# Patient Record
(demographics unavailable — no encounter records)

---

## 2025-04-30 NOTE — PHYSICAL EXAM
[de-identified] : Neurologic: normal mood and affect, orientated and able to communicate Constitutional: well developed and well nourished  Left Knee: mild effusion +Lachman +anterior drawer Proximal MCL tenderness 1+ valgus with pain

## 2025-04-30 NOTE — DISCUSSION/SUMMARY
[de-identified] : Reviewed all X Ray images with patient today and interpretation was provided. Reviewed all MRI images with patient today and interpretation was provided. Discussed treatment options, including surgery. Patient deferred surgery at this time.   Advised patient to custom A22 wear knee brace. Information for Shock Doctor knee brace provided. Doctor email provided to patient.   Will consider surgery in a few months, will email with decision  Anila Custom Knee Brace The patient is prescribed a custom A22 brace today for return to activities of daily living. This brace is indicated to protect the involved knee due to instability during the continued ligamentous healing process, and while the patient increases their activities of daily living. At this point the patient presents with sustained muscle atrophy, proprioceptive deficiency and motor imbalance in their involved knee. The custom nature of the brace is required to match the natural contours of this patient's thigh to calf ratio which would not fit into an off the shelf model     ----------------------------------------------- Home Exercise The patient is instructed on a home exercise program.   YESI ORANTES Acting as a Scribe for Dr. Mikael HINKLE, Yesi Orantes, attest that this documentation has been prepared under the direction and in the presence of Provider Dr. Youssef.   Activity Modification The patient was advised to modify their activities.   Dx / Natural History The patient was advised of the diagnosis. The natural history of the pathology was explained in full to the patient in layman's terms. Several different treatment options were discussed and explained in full to the patient including the risks and benefits of both surgical and non-surgical treatments.  All questions and concerns were answered.   Pain Guide Activities The patient was advised to let pain guide the gradual advancement of activities.   ANUPAM HINKLE explained to the patient that rest, ice, compression, and elevation would benefit them. They may return to activity after follow-up or when they no longer have any pain.   The patient's current medication management of their orthopedic diagnosis was reviewed today: (1) We discussed a comprehensive treatment plan that included possible pharmaceutical management involving the use of prescription strength medications including but not limited to options such as oral Naprosyn 500mg BID, once daily Meloxicam 15 mg, or 500-650 mg Tylenol versus over the counter oral medications and topical prescription NSAID Pennsaid vs over the counter Voltaren gel. (2) There is a moderate risk of morbidity with further treatment, especially from use of prescription strength medications and possible side effects of these medications which include upset stomach with oral medications, skin reactions to topical medications and cardiac/renal issues with long term use. (3) I recommended that the patient follow-up with their medical physician to discuss any significant specific potential issues with long term medication use such as interactions with current medications or with exacerbation of underlying medical comorbidities. (4) The benefits and risks associated with use of injectable, oral or topical, prescription and over the counter anti-inflammatory medications were discussed with the patient. The patient voiced understanding of the risks including but not limited to bleeding, stroke, kidney dysfunction, heart disease, and were referred to the black box warning label for further information.

## 2025-04-30 NOTE — HISTORY OF PRESENT ILLNESS
[de-identified] : The patient is a 22 year old [RIGHT] hand dominant male who presents today complaining of LT KNEE Pain.   Date of Injury/Onset: 4/25/25 Pain:    At Rest: 2/10  With Activity:  6/10  Mechanism of injury: First hurt his knee snowboarding in Feb, then twisted it Friday This is NOT a Work Related Injury being treated under Worker's Compensation. This is NOT an athletic injury occurring associated with an interscholastic or organized sports team. Quality of symptoms: diffuse pain throughout knee Improves with: res, activity modification, use of brace Worse with: change of direction, pivotting, FWB Prior treatment: seen at [facility] on 4/27/25 Prior Imaging: MRI @ [facility] 4/27/25 Out of work/sport: currently working  School/Sport/Position/Occupation: HVAC Additional Information: None

## 2025-04-30 NOTE — HISTORY OF PRESENT ILLNESS
[de-identified] : The patient is a 22 year old [RIGHT] hand dominant male who presents today complaining of LT KNEE Pain.   Date of Injury/Onset: 4/25/25 Pain:    At Rest: 2/10  With Activity:  6/10  Mechanism of injury: First hurt his knee snowboarding in Feb, then twisted it Friday This is NOT a Work Related Injury being treated under Worker's Compensation. This is NOT an athletic injury occurring associated with an interscholastic or organized sports team. Quality of symptoms: diffuse pain throughout knee Improves with: res, activity modification, use of brace Worse with: change of direction, pivotting, FWB Prior treatment: seen at [facility] on 4/27/25 Prior Imaging: MRI @ [facility] 4/27/25 Out of work/sport: currently working  School/Sport/Position/Occupation: HVAC Additional Information: None

## 2025-04-30 NOTE — ADDENDUM
[FreeTextEntry1] : Documented by Benny Avery acting as a scribe for Dr. Youssef and Gray Wheat PA-C on 04/29/2025 and was presence for the following sections: Physical Exam; Data Reviewed; Assessment; Discussion/Summary. All medical record entries made by the Scribe were at my, Dr. Youssef, and Gray Wheat, direction and personally dictated by me on 04/29/2025. I have reviewed the chart and agree that the record accurately reflects my personal performance of the history, physical exam, procedure and imaging.

## 2025-04-30 NOTE — PHYSICAL EXAM
[de-identified] : Neurologic: normal mood and affect, orientated and able to communicate Constitutional: well developed and well nourished  Left Knee: mild effusion +Lachman +anterior drawer Proximal MCL tenderness 1+ valgus with pain

## 2025-04-30 NOTE — DISCUSSION/SUMMARY
[de-identified] : Reviewed all X Ray images with patient today and interpretation was provided. Reviewed all MRI images with patient today and interpretation was provided. Discussed treatment options, including surgery. Patient deferred surgery at this time.   Advised patient to custom A22 wear knee brace. Information for Shock Doctor knee brace provided. Doctor email provided to patient.   Will consider surgery in a few months, will email with decision  Anila Custom Knee Brace The patient is prescribed a custom A22 brace today for return to activities of daily living. This brace is indicated to protect the involved knee due to instability during the continued ligamentous healing process, and while the patient increases their activities of daily living. At this point the patient presents with sustained muscle atrophy, proprioceptive deficiency and motor imbalance in their involved knee. The custom nature of the brace is required to match the natural contours of this patient's thigh to calf ratio which would not fit into an off the shelf model     ----------------------------------------------- Home Exercise The patient is instructed on a home exercise program.   YESI ORANTES Acting as a Scribe for Dr. Mikael HINKLE, Yesi Orantes, attest that this documentation has been prepared under the direction and in the presence of Provider Dr. Youssef.   Activity Modification The patient was advised to modify their activities.   Dx / Natural History The patient was advised of the diagnosis. The natural history of the pathology was explained in full to the patient in layman's terms. Several different treatment options were discussed and explained in full to the patient including the risks and benefits of both surgical and non-surgical treatments.  All questions and concerns were answered.   Pain Guide Activities The patient was advised to let pain guide the gradual advancement of activities.   ANUPAM HIKNLE explained to the patient that rest, ice, compression, and elevation would benefit them. They may return to activity after follow-up or when they no longer have any pain.   The patient's current medication management of their orthopedic diagnosis was reviewed today: (1) We discussed a comprehensive treatment plan that included possible pharmaceutical management involving the use of prescription strength medications including but not limited to options such as oral Naprosyn 500mg BID, once daily Meloxicam 15 mg, or 500-650 mg Tylenol versus over the counter oral medications and topical prescription NSAID Pennsaid vs over the counter Voltaren gel. (2) There is a moderate risk of morbidity with further treatment, especially from use of prescription strength medications and possible side effects of these medications which include upset stomach with oral medications, skin reactions to topical medications and cardiac/renal issues with long term use. (3) I recommended that the patient follow-up with their medical physician to discuss any significant specific potential issues with long term medication use such as interactions with current medications or with exacerbation of underlying medical comorbidities. (4) The benefits and risks associated with use of injectable, oral or topical, prescription and over the counter anti-inflammatory medications were discussed with the patient. The patient voiced understanding of the risks including but not limited to bleeding, stroke, kidney dysfunction, heart disease, and were referred to the black box warning label for further information.

## 2025-04-30 NOTE — DATA REVIEWED
[FreeTextEntry1] : 4/27/25 [facility] MRI Left Knee: This scan was reviewed and interpreted by Dr. Youssef, and his findings are- IMPRESSION: 1. Full-thickness rupture of the anterior cruciate ligament. No apparent osseous contusions in the lateral compartment to suggest a recent pivot shift. 2. Low-grade MCL sprain with surrounding mild soft tissue edema. 3. No medial meniscal tear. 4. Moderate knee joint effusion with moderate posterior extracapsular soft tissue edema.  4/27/25 [facility] X-RAY Left Knee 4 views: This scan was reviewed and interpreted by Dr. Youssef, and his findings are- patella sheeba, otherwise unremarkable